# Patient Record
Sex: MALE | Race: WHITE | Employment: STUDENT | URBAN - METROPOLITAN AREA
[De-identification: names, ages, dates, MRNs, and addresses within clinical notes are randomized per-mention and may not be internally consistent; named-entity substitution may affect disease eponyms.]

---
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
## 2024-08-20 ENCOUNTER — APPOINTMENT (OUTPATIENT)
Dept: GENERAL RADIOLOGY | Facility: HOSPITAL | Age: 19
End: 2024-08-20
Attending: EMERGENCY MEDICINE
Payer: MEDICAID

## 2024-08-20 ENCOUNTER — HOSPITAL ENCOUNTER (EMERGENCY)
Facility: HOSPITAL | Age: 19
Discharge: HOME | End: 2024-08-20
Attending: EMERGENCY MEDICINE
Payer: MEDICAID

## 2024-08-20 VITALS
DIASTOLIC BLOOD PRESSURE: 68 MMHG | TEMPERATURE: 98 F | HEART RATE: 77 BPM | OXYGEN SATURATION: 99 % | SYSTOLIC BLOOD PRESSURE: 130 MMHG | BODY MASS INDEX: 30.48 KG/M2 | WEIGHT: 225 LBS | RESPIRATION RATE: 16 BRPM | HEIGHT: 72 IN

## 2024-08-20 DIAGNOSIS — S49.91XA INJURY OF RIGHT SHOULDER, INITIAL ENCOUNTER: Primary | ICD-10-CM

## 2024-08-20 PROCEDURE — 73030 X-RAY EXAM OF SHOULDER: CPT | Performed by: EMERGENCY MEDICINE

## 2024-08-20 PROCEDURE — 99283 EMERGENCY DEPT VISIT LOW MDM: CPT

## 2024-08-20 NOTE — ED INITIAL ASSESSMENT (HPI)
Pt came in for right shoulder injury.  Pt states that he had \"shoulder to shoulder \" with somebody while playing football happened Saturday.  Pt with limited ROM to the right shoulder.  A/Ox  4, breathing unlabored.  Took Ibuprofen 3 tabs at 7 AM.

## 2024-08-20 NOTE — ED PROVIDER NOTES
Patient Seen in: [facility] Emergency Department      History     Chief Complaint   Patient presents with    Shoulder Injury     Stated Complaint: R shoulder injury    Subjective:   HPI        Objective:   No pertinent past medical history.            No pertinent past surgical history.              No pertinent social history.            Review of Systems    Positive for stated Chief Complaint: Shoulder Injury    Other systems are as noted in HPI.  Constitutional and vital signs reviewed.      All other systems reviewed and negative except as noted above.    Physical Exam     ED Triage Vitals   BP 08/20/24 1345 138/80   Pulse 08/20/24 1343 70   Resp 08/20/24 1343 20   Temp 08/20/24 1343 98 °F (36.7 °C)   Temp src 08/20/24 1343 Oral   SpO2 08/20/24 1343 98 %   O2 Device 08/20/24 1343 None (Room air)       Current Vitals:   Vital Signs  BP: 130/68  Pulse: 77  Resp: 16  Temp: 98 °F (36.7 °C)  Temp src: Oral  MAP (mmHg): 100    Oxygen Therapy  SpO2: 99 %  O2 Device: None (Room air)            Physical Exam          ED Course   Labs Reviewed - No data to display                   MDM      19-year-old male without significant past medical history presents with right shoulder pain.  Patient states that 3 days ago, while playing football, he had a shoulder to shoulder contact injury with another player.  Initially there was not much pain but he has developed soreness on the superior and anterior aspects of the shoulder in the subsequent days.  Pain with range of motion.  No numbness/tingling/weakness.    On exam, vitals normal, well-appearing, right shoulder without any palpable deformity or swelling.  Neurovascularly intact.  Some pain with range of motion.    Differential: Most likely SITS injury, concern for AC sprain, possible fracture/dislocation    I personally reviewed the x-rays and agree with the radiologist read: no acute fracture visualized.    Patient advised on care at home, PMD versus orthopedic  follow-up, and return precautions.                                   MDM    Disposition and Plan     Clinical Impression:  1. Injury of right shoulder, initial encounter         Disposition:  Discharge  8/20/2024  2:33 pm    Follow-up:  Andres Dumont MD  [address] [place] [address] [phone]    Follow up in 2 week(s)  As needed    Wilmer Arthur MD  [address] [id number] [phone]    Follow up in 2 week(s)  As needed          Medications Prescribed:  There are no discharge medications for this patient.

## 2025-06-14 ENCOUNTER — HOSPITAL ENCOUNTER (EMERGENCY)
Facility: HOSPITAL | Age: 20
Discharge: HOME | End: 2025-06-14
Attending: EMERGENCY MEDICINE
Payer: MEDICAID

## 2025-06-14 VITALS
DIASTOLIC BLOOD PRESSURE: 78 MMHG | RESPIRATION RATE: 18 BRPM | OXYGEN SATURATION: 96 % | HEIGHT: 72 IN | TEMPERATURE: 97 F | BODY MASS INDEX: 30.48 KG/M2 | HEART RATE: 84 BPM | SYSTOLIC BLOOD PRESSURE: 157 MMHG | WEIGHT: 225 LBS

## 2025-06-14 DIAGNOSIS — L02.31 ABSCESS OF GLUTEAL CLEFT: Primary | ICD-10-CM

## 2025-06-14 PROCEDURE — 10060 I&D ABSCESS SIMPLE/SINGLE: CPT

## 2025-06-14 PROCEDURE — 99284 EMERGENCY DEPT VISIT MOD MDM: CPT

## 2025-06-14 PROCEDURE — 99283 EMERGENCY DEPT VISIT LOW MDM: CPT

## 2025-06-14 RX ORDER — CEPHALEXIN 500 MG/1
500 CAPSULE ORAL 3 TIMES DAILY
Qty: 21 CAPSULE | Refills: 0 | Status: SHIPPED | OUTPATIENT
Start: 2025-06-14 | End: 2025-06-21

## 2025-06-14 NOTE — ED PROVIDER NOTES
Patient Seen in: [facility] Emergency Department        History  Chief Complaint   Patient presents with    Musculoskeletal Problem     Stated Complaint: bruised tailbone    Subjective:   HPI            The patient is a 20-year-old male who presents with pain and swelling over his tailbone for the last 2 days.  Today he had difficulty sitting due to the pain.  No injury.  No fevers or chills.      Objective:     History reviewed. No pertinent past medical history.           History reviewed. No pertinent surgical history.             Social History     Socioeconomic History    Marital status: Single   Tobacco Use    Smoking status: Never    Smokeless tobacco: Never   Vaping Use    Vaping status: Never Used   Substance and Sexual Activity    Alcohol use: Never    Drug use: Never                                Physical Exam    ED Triage Vitals [06/14/25 0922]   /78   Pulse 84   Resp 18   Temp 97 °F (36.1 °C)   Temp src Temporal   SpO2 96 %   O2 Device None (Room air)       Current Vitals:   Vital Signs  BP: 157/78  Pulse: 84  Resp: 18  Temp: 97 °F (36.1 °C)  Temp src: Temporal    Oxygen Therapy  SpO2: 96 %  O2 Device: None (Room air)            Physical Exam  Vitals and nursing note reviewed.   Constitutional:       General: He is not in acute distress.     Appearance: Normal appearance. He is not ill-appearing.   HENT:      Head: Normocephalic.      Mouth/Throat:      Mouth: Mucous membranes are moist.   Eyes:      Conjunctiva/sclera: Conjunctivae normal.   Cardiovascular:      Rate and Rhythm: Normal rate.   Pulmonary:      Effort: Pulmonary effort is normal.   Abdominal:      Palpations: Abdomen is soft.      Tenderness: There is no abdominal tenderness.   Musculoskeletal:         General: Normal range of motion.   Skin:     General: Skin is warm and dry.      Findings: Erythema present.          Neurological:      Mental Status: He is alert.           Differential diagnosis includes pilonidal cyst  abscess, other abscess      ED Course  Labs Reviewed - No data to display       Procedure/incision and drainage  Area prepped and draped under sterile conditions anesthetized with 1% lidocaine without epinephrine and incised with 11 blade with large pus expressed, packing placed patient tolerated procedure well                  MDM             Medical Decision Making      Disposition and Plan     Clinical Impression:  1. Abscess of gluteal cleft         Disposition:  Discharge  6/14/2025 12:08 pm    Follow-up:  Andres Dumont MD  [address] [place] [address] [phone]    Schedule an appointment as soon as possible for a visit in 2 day(s)  For wound re-check          Medications Prescribed:  Current Discharge Medication List        START taking these medications    Details   cephALEXin 500 MG Oral Cap Take 1 capsule (500 mg total) by mouth 3 (three) times daily for 7 days.  Qty: 21 capsule, Refills: 0                   Supplementary Documentation:

## 2025-06-14 NOTE — DISCHARGE INSTRUCTIONS
Take antibiotics as directed  Follow-up with your doctor in 2 days for wound check  Return if increased pain swelling fever

## 2025-06-14 NOTE — ED INITIAL ASSESSMENT (HPI)
Patient arrived via triage for a bruise on the tailbone. Patient state it started two days ago. Patient denies any injury. Patient state the pain/bruise is to the left side. Patient has pain while ambulating.

## (undated) NOTE — ED AVS SNAPSHOT
Parent/Legal Guardian Access to the Online [employer] Record of a Patient 15to 16Years Old  Return completed form by Secure email to [place] HIM/Medical Records Department: [email or web address].     Requirements and Procedures   Under [facility] MyChart ID and password with another person, that person may be able to view my or my child’s health information, and health information about someone who has authorized me as a MyChart proxy.    ·  I agree that it is my responsibility to select a confident Sign-Up Form and I agree to its terms.        Authorization Form     Please enter Patient’s information below:   Name (last, first, middle initial) __________________________________________   Gender  Male  Female    Last 4 Digits of Social Security Number Parent/Legal Guardian Signature                                  For Patient (1517 years of age)  I agree to allow my parent/legal guardian, named above, online access to my medical information currently available and that may become available as a result